# Patient Record
Sex: FEMALE | Race: WHITE | NOT HISPANIC OR LATINO | Employment: UNEMPLOYED | URBAN - NONMETROPOLITAN AREA
[De-identification: names, ages, dates, MRNs, and addresses within clinical notes are randomized per-mention and may not be internally consistent; named-entity substitution may affect disease eponyms.]

---

## 2024-05-17 ENCOUNTER — TELEPHONE (OUTPATIENT)
Dept: PRIMARY CARE | Facility: CLINIC | Age: 24
End: 2024-05-17

## 2024-05-17 NOTE — TELEPHONE ENCOUNTER
Mom calling said pt needs to have a letter sent to [facility] court  Attn Lu Fax [phone] to excuse her from Jury duty # [id number] week of service June 17, 2024.   Please call mom when faxed.

## 2024-05-17 NOTE — PROGRESS NOTES
To Whom It Concerns,    This letter is in regards to my patient, Luz Soriano, date of birth 2000.    Please excuse her from Jury Duty # [id number]   due to her chronic Medical Illness.    Sincerely,    Cedrick Babb M.D.

## 2024-06-28 ENCOUNTER — APPOINTMENT (OUTPATIENT)
Dept: PRIMARY CARE | Facility: CLINIC | Age: 24
End: 2024-06-28
Payer: COMMERCIAL

## 2024-06-28 VITALS
WEIGHT: 102.7 LBS | RESPIRATION RATE: 14 BRPM | OXYGEN SATURATION: 96 % | TEMPERATURE: 97.9 F | HEART RATE: 74 BPM | DIASTOLIC BLOOD PRESSURE: 72 MMHG | SYSTOLIC BLOOD PRESSURE: 103 MMHG | BODY MASS INDEX: 23.44 KG/M2

## 2024-06-28 DIAGNOSIS — R21 RASH: ICD-10-CM

## 2024-06-28 DIAGNOSIS — M79.89 LEFT LEG SWELLING: Primary | ICD-10-CM

## 2024-06-28 PROCEDURE — 99214 OFFICE O/P EST MOD 30 MIN: CPT | Performed by: FAMILY MEDICINE

## 2024-06-28 PROCEDURE — 1036F TOBACCO NON-USER: CPT | Performed by: FAMILY MEDICINE

## 2024-06-28 RX ORDER — DIPHENHYDRAMINE HCL 25 MG
TABLET ORAL
COMMUNITY
Start: 2023-05-12

## 2024-06-28 RX ORDER — MEDROXYPROGESTERONE ACETATE 150 MG/ML
INJECTION, SUSPENSION INTRAMUSCULAR
COMMUNITY

## 2024-06-28 RX ORDER — LANSOPRAZOLE 15 MG/1
TABLET, ORALLY DISINTEGRATING, DELAYED RELEASE ORAL
COMMUNITY
Start: 2016-01-05

## 2024-06-28 RX ORDER — CARBAMAZEPINE 300 MG/1
CAPSULE, EXTENDED RELEASE ORAL
COMMUNITY

## 2024-06-28 RX ORDER — TRIAMCINOLONE ACETONIDE 1 MG/G
CREAM TOPICAL 2 TIMES DAILY
Qty: 30 G | Refills: 0 | Status: SHIPPED | OUTPATIENT
Start: 2024-06-28 | End: 2024-07-05

## 2024-06-28 RX ORDER — POLYETHYLENE GLYCOL 3350 17 G/17G
POWDER, FOR SOLUTION ORAL
COMMUNITY

## 2024-06-28 RX ORDER — CYANOCOBALAMIN (VITAMIN B-12) 500 MCG
TABLET ORAL
COMMUNITY

## 2024-06-28 RX ORDER — ESZOPICLONE 2 MG/1
1 TABLET, FILM COATED ORAL NIGHTLY
COMMUNITY
Start: 2022-06-24

## 2024-06-28 NOTE — PROGRESS NOTES
Subjective   Patient ID: Luz Soriano is a 24 y.o. female who presents for an ED follow up. History comes from mother, who was present during exam    HPI   ED location: [place] [facility], [facility]  Date: 6/8/2024  Reason: LLE swelling  Negative D-dimer  Uses depo for birth control, patient does ambulate but is non-verbal 2/2 CP  Mom is not certain how long the swelling has been present, but mom noticed it during the heat wave when she was wearing shorts  Both knees seemed swollen too during that week  Mom reports LLE feels different than RLE, even thigh seems somewhat swollen  Left side is her weak side, mom states she is over plantarflexed when walking and limps at baseline    Rash  Mom reports patient has had an abdominal rash x 3 weeks  No recent illness or topicals, no new medications  Patient has been scratching at it  No h/o eczema    Review of Systems   All other systems reviewed and are negative.    Objective   /72 (BP Location: Right arm, Patient Position: Sitting, BP Cuff Size: Adult)   Pulse 74   Temp 36.6 °C (97.9 °F) (Temporal)   Resp 14   Wt 46.6 kg (102 lb 11.2 oz)   SpO2 96%   BMI 23.44 kg/m²     Physical Exam  Constitutional: Well developed, well nourished, alert and in no acute distress   Eyes: Normal external exam.   Cardiovascular: Regular rate and rhythm, normal S1 and S2, no murmurs, gallops, or rubs. Radial pulses normal.   Pulmonary: No respiratory distress, lungs clear to auscultation bilaterally. No wheezes, rhonchi, rales.  Skin: Warm, well perfused, normal skin turgor, +several small scatter erythematous dry patches across abdomen (7 total)  Neurologic: Cranial nerves II-XII grossly intact.   Psychiatric: Mood calm and affect normal.  LLE: NTTP, measures 3/4in larger than right LLE, no warmth/erythema or pitting edema, no color changes. +soft, cystic like structure noted at posterior knee    Assessment/Plan   ED records reviewed    START Triamcinolone topical for spot treatment as  directed x 7 days.    US LLE knee    Please contact me if either symptoms worsen    Follow up with PCP

## 2024-07-10 ENCOUNTER — HOSPITAL ENCOUNTER (OUTPATIENT)
Dept: RADIOLOGY | Facility: CLINIC | Age: 24
End: 2024-07-10
Payer: COMMERCIAL

## 2024-07-16 ENCOUNTER — HOSPITAL ENCOUNTER (OUTPATIENT)
Dept: RADIOLOGY | Facility: CLINIC | Age: 24
Discharge: HOME | End: 2024-07-16
Payer: COMMERCIAL

## 2024-07-16 DIAGNOSIS — M79.89 LEFT LEG SWELLING: ICD-10-CM

## 2024-07-16 PROCEDURE — 93971 EXTREMITY STUDY: CPT

## 2024-07-16 PROCEDURE — 93971 EXTREMITY STUDY: CPT | Performed by: RADIOLOGY

## 2024-07-17 NOTE — RESULT ENCOUNTER NOTE
Negative test for a blood clot and cyst, no etiology for swelling noted on testing. If swelling persists and is concerning, please contact us.

## 2024-09-23 ENCOUNTER — PATIENT MESSAGE (OUTPATIENT)
Dept: PRIMARY CARE | Facility: CLINIC | Age: 24
End: 2024-09-23
Payer: COMMERCIAL

## 2024-09-24 DIAGNOSIS — G47.00 INSOMNIA, UNSPECIFIED TYPE: ICD-10-CM

## 2024-09-25 RX ORDER — DIPHENHYDRAMINE HCL 25 MG
50 TABLET ORAL NIGHTLY PRN
Qty: 60 TABLET | Refills: 0 | Status: SHIPPED | OUTPATIENT
Start: 2024-09-25

## 2024-12-04 ENCOUNTER — TELEPHONE (OUTPATIENT)
Dept: PRIMARY CARE | Facility: CLINIC | Age: 24
End: 2024-12-04
Payer: COMMERCIAL

## 2024-12-04 NOTE — TELEPHONE ENCOUNTER
Mom came in and dropped off paper work to fill out for Insurance .  Sent in your mailbox.      Please call when ready [phone]

## 2025-02-03 ENCOUNTER — TELEPHONE (OUTPATIENT)
Dept: PRIMARY CARE | Facility: CLINIC | Age: 25
End: 2025-02-03
Payer: COMMERCIAL

## 2025-02-03 DIAGNOSIS — G47.00 INSOMNIA, UNSPECIFIED TYPE: ICD-10-CM

## 2025-02-03 RX ORDER — DIPHENHYDRAMINE HCL 25 MG
50 TABLET ORAL NIGHTLY PRN
Qty: 60 TABLET | Refills: 11 | Status: SHIPPED | OUTPATIENT
Start: 2025-02-03